# Patient Record
Sex: FEMALE | Race: BLACK OR AFRICAN AMERICAN | Employment: FULL TIME
[De-identification: names, ages, dates, MRNs, and addresses within clinical notes are randomized per-mention and may not be internally consistent; named-entity substitution may affect disease eponyms.]

---

## 2023-12-14 ENCOUNTER — OFFICE VISIT (OUTPATIENT)
Dept: FAMILY MEDICINE CLINIC | Facility: CLINIC | Age: 43
End: 2023-12-14
Payer: COMMERCIAL

## 2023-12-14 VITALS
OXYGEN SATURATION: 97 % | HEIGHT: 62 IN | BODY MASS INDEX: 24.29 KG/M2 | DIASTOLIC BLOOD PRESSURE: 88 MMHG | TEMPERATURE: 98.6 F | WEIGHT: 132 LBS | HEART RATE: 90 BPM | SYSTOLIC BLOOD PRESSURE: 134 MMHG

## 2023-12-14 DIAGNOSIS — Z00.00 ROUTINE GENERAL MEDICAL EXAMINATION AT A HEALTH CARE FACILITY: Primary | ICD-10-CM

## 2023-12-14 DIAGNOSIS — Z13.220 SCREENING FOR LIPID DISORDERS: ICD-10-CM

## 2023-12-14 DIAGNOSIS — Z11.4 SCREENING FOR HIV (HUMAN IMMUNODEFICIENCY VIRUS): ICD-10-CM

## 2023-12-14 DIAGNOSIS — E55.9 VITAMIN D DEFICIENCY: ICD-10-CM

## 2023-12-14 DIAGNOSIS — R20.2 TINGLING OF LEFT UPPER EXTREMITY: ICD-10-CM

## 2023-12-14 DIAGNOSIS — Z23 NEED FOR TETANUS BOOSTER: ICD-10-CM

## 2023-12-14 DIAGNOSIS — R53.82 CHRONIC FATIGUE: ICD-10-CM

## 2023-12-14 DIAGNOSIS — R41.840 ATTENTION AND CONCENTRATION DEFICIT: ICD-10-CM

## 2023-12-14 DIAGNOSIS — Z11.59 NEED FOR HEPATITIS C SCREENING TEST: ICD-10-CM

## 2023-12-14 LAB
BASOPHILS # BLD: 0.1 K/UL (ref 0–0.2)
BASOPHILS NFR BLD: 1 % (ref 0–2)
DIFFERENTIAL METHOD BLD: NORMAL
EOSINOPHIL # BLD: 0.2 K/UL (ref 0–0.8)
EOSINOPHIL NFR BLD: 2 % (ref 0.5–7.8)
ERYTHROCYTE [DISTWIDTH] IN BLOOD BY AUTOMATED COUNT: 14 % (ref 11.9–14.6)
HCT VFR BLD AUTO: 39.5 % (ref 35.8–46.3)
HCV AB SER QL: NONREACTIVE
HGB BLD-MCNC: 13 G/DL (ref 11.7–15.4)
HIV 1+2 AB+HIV1 P24 AG SERPL QL IA: NONREACTIVE
HIV 1/2 RESULT COMMENT: NORMAL
IMM GRANULOCYTES # BLD AUTO: 0 K/UL (ref 0–0.5)
IMM GRANULOCYTES NFR BLD AUTO: 0 % (ref 0–5)
LYMPHOCYTES # BLD: 2.8 K/UL (ref 0.5–4.6)
LYMPHOCYTES NFR BLD: 34 % (ref 13–44)
MCH RBC QN AUTO: 31.6 PG (ref 26.1–32.9)
MCHC RBC AUTO-ENTMCNC: 32.9 G/DL (ref 31.4–35)
MCV RBC AUTO: 95.9 FL (ref 82–102)
MONOCYTES # BLD: 0.8 K/UL (ref 0.1–1.3)
MONOCYTES NFR BLD: 9 % (ref 4–12)
NEUTS SEG # BLD: 4.5 K/UL (ref 1.7–8.2)
NEUTS SEG NFR BLD: 54 % (ref 43–78)
NRBC # BLD: 0 K/UL (ref 0–0.2)
PLATELET # BLD AUTO: 400 K/UL (ref 150–450)
PMV BLD AUTO: 9.8 FL (ref 9.4–12.3)
RBC # BLD AUTO: 4.12 M/UL (ref 4.05–5.2)
WBC # BLD AUTO: 8.3 K/UL (ref 4.3–11.1)

## 2023-12-14 PROCEDURE — 4004F PT TOBACCO SCREEN RCVD TLK: CPT

## 2023-12-14 PROCEDURE — 90471 IMMUNIZATION ADMIN: CPT

## 2023-12-14 PROCEDURE — G8420 CALC BMI NORM PARAMETERS: HCPCS

## 2023-12-14 PROCEDURE — 90715 TDAP VACCINE 7 YRS/> IM: CPT

## 2023-12-14 PROCEDURE — 99204 OFFICE O/P NEW MOD 45 MIN: CPT

## 2023-12-14 PROCEDURE — G8427 DOCREV CUR MEDS BY ELIG CLIN: HCPCS

## 2023-12-14 PROCEDURE — G8484 FLU IMMUNIZE NO ADMIN: HCPCS

## 2023-12-14 RX ORDER — METHYLPREDNISOLONE 4 MG/1
TABLET ORAL
Qty: 1 KIT | Refills: 0 | Status: SHIPPED | OUTPATIENT
Start: 2023-12-14 | End: 2023-12-20

## 2023-12-14 RX ORDER — IBUPROFEN 800 MG/1
800 TABLET ORAL 2 TIMES DAILY PRN
Qty: 28 TABLET | Refills: 0 | Status: SHIPPED | OUTPATIENT
Start: 2023-12-14 | End: 2023-12-28

## 2023-12-14 RX ORDER — CALCIUM CARBONATE 500 MG/1
1 TABLET, CHEWABLE ORAL DAILY
COMMUNITY

## 2023-12-14 SDOH — ECONOMIC STABILITY: HOUSING INSECURITY
IN THE LAST 12 MONTHS, WAS THERE A TIME WHEN YOU DID NOT HAVE A STEADY PLACE TO SLEEP OR SLEPT IN A SHELTER (INCLUDING NOW)?: PATIENT REFUSED

## 2023-12-14 SDOH — ECONOMIC STABILITY: FOOD INSECURITY: WITHIN THE PAST 12 MONTHS, THE FOOD YOU BOUGHT JUST DIDN'T LAST AND YOU DIDN'T HAVE MONEY TO GET MORE.: PATIENT DECLINED

## 2023-12-14 SDOH — ECONOMIC STABILITY: INCOME INSECURITY: HOW HARD IS IT FOR YOU TO PAY FOR THE VERY BASICS LIKE FOOD, HOUSING, MEDICAL CARE, AND HEATING?: PATIENT DECLINED

## 2023-12-14 SDOH — HEALTH STABILITY: PHYSICAL HEALTH: ON AVERAGE, HOW MANY DAYS PER WEEK DO YOU ENGAGE IN MODERATE TO STRENUOUS EXERCISE (LIKE A BRISK WALK)?: 0 DAYS

## 2023-12-14 SDOH — ECONOMIC STABILITY: FOOD INSECURITY: WITHIN THE PAST 12 MONTHS, YOU WORRIED THAT YOUR FOOD WOULD RUN OUT BEFORE YOU GOT MONEY TO BUY MORE.: PATIENT DECLINED

## 2023-12-14 ASSESSMENT — PATIENT HEALTH QUESTIONNAIRE - PHQ9
6. FEELING BAD ABOUT YOURSELF - OR THAT YOU ARE A FAILURE OR HAVE LET YOURSELF OR YOUR FAMILY DOWN: 0
SUM OF ALL RESPONSES TO PHQ QUESTIONS 1-9: 15
SUM OF ALL RESPONSES TO PHQ QUESTIONS 1-9: 15
SUM OF ALL RESPONSES TO PHQ9 QUESTIONS 1 & 2: 6
7. TROUBLE CONCENTRATING ON THINGS, SUCH AS READING THE NEWSPAPER OR WATCHING TELEVISION: 3
SUM OF ALL RESPONSES TO PHQ QUESTIONS 1-9: 15
1. LITTLE INTEREST OR PLEASURE IN DOING THINGS: 3
8. MOVING OR SPEAKING SO SLOWLY THAT OTHER PEOPLE COULD HAVE NOTICED. OR THE OPPOSITE, BEING SO FIGETY OR RESTLESS THAT YOU HAVE BEEN MOVING AROUND A LOT MORE THAN USUAL: 0
4. FEELING TIRED OR HAVING LITTLE ENERGY: 3
5. POOR APPETITE OR OVEREATING: 0
10. IF YOU CHECKED OFF ANY PROBLEMS, HOW DIFFICULT HAVE THESE PROBLEMS MADE IT FOR YOU TO DO YOUR WORK, TAKE CARE OF THINGS AT HOME, OR GET ALONG WITH OTHER PEOPLE: 1
2. FEELING DOWN, DEPRESSED OR HOPELESS: 3
SUM OF ALL RESPONSES TO PHQ QUESTIONS 1-9: 15
3. TROUBLE FALLING OR STAYING ASLEEP: 3
9. THOUGHTS THAT YOU WOULD BE BETTER OFF DEAD, OR OF HURTING YOURSELF: 0

## 2023-12-14 NOTE — PROGRESS NOTES
nerve inflammation in left arm- will start with steroid and NSAID. Encouraged to message in 2-3 weeks if no improvement as we may need to obtain imaging or refer to Ortho. Assessment & Plan  Pt was encouraged to sign up/go to [facility] for access to information at a later time. Follow Up    Return in about 6 months (around 6/14/2024) for follow up; pap smear .       Orders Placed This Encounter   Procedures    [facility] (age 8 yrs+), IM    CBC with Auto Differential     Standing Status:   Future     Number of Occurrences:   1     Standing Expiration Date:   12/14/2024    Comprehensive Metabolic Panel     Standing Status:   Future     Number of Occurrences:   1     Standing Expiration Date:   1/14/2024    Lipid Panel     Standing Status:   Future     Number of Occurrences:   1     Standing Expiration Date:   1/14/2024     Order Specific Question:   Is Patient Fasting?/# of Hours     Answer:   no    Vitamin D 25 Hydroxy     Standing Status:   Future     Number of Occurrences:   1     Standing Expiration Date:   1/14/2024    TSH with Reflex     Standing Status:   Future     Number of Occurrences:   1     Standing Expiration Date:   1/14/2024    Hepatitis C Antibody     Standing Status:   Future     Number of Occurrences:   1     Standing Expiration Date:   6/14/2024    HIV 1/2 Ag/Ab, 4TH Generation,W Rflx Confirm     Standing Status:   Future     Number of Occurrences:   1     Standing Expiration Date:   3/14/2024    [facility] ([facility])     Referral Priority:   Routine     Referral Type:   Eval and Treat     Referral Reason:   Specialty Services Required     Referral Location:   [facility] ([facility])     Requested Specialty:   Psychiatry     Number of Visits Requested:   [id number] [facility],First Floor, APRN - CNP   [id number] [facility],[address] [address]  Dept: [phone]  Dept Fax:

## 2023-12-14 NOTE — PATIENT INSTRUCTIONS
Please let me know if you don't improve with your reflux and left arm tingling within 2-3 weeks. I can prescribe a medication for the reflux that may help. If the tingling does not improve, we may have to consider an Ortho referral or possible imaging. Do not take additional NSAIDS like Aleve with the Ibuprofen. You can take Tylenol with the Ibuprofen.

## 2023-12-15 DIAGNOSIS — E55.9 VITAMIN D DEFICIENCY: Primary | ICD-10-CM

## 2023-12-15 LAB
25(OH)D3 SERPL-MCNC: 8.3 NG/ML (ref 30–100)
ALBUMIN SERPL-MCNC: 4 G/DL (ref 3.5–5)
ALBUMIN/GLOB SERPL: 1.1 (ref 0.4–1.6)
ALP SERPL-CCNC: 65 U/L (ref 50–136)
ALT SERPL-CCNC: 21 U/L (ref 12–65)
ANION GAP SERPL CALC-SCNC: 5 MMOL/L (ref 2–11)
AST SERPL-CCNC: 14 U/L (ref 15–37)
BILIRUB SERPL-MCNC: 0.5 MG/DL (ref 0.2–1.1)
BUN SERPL-MCNC: 13 MG/DL (ref 6–23)
CALCIUM SERPL-MCNC: 9.1 MG/DL (ref 8.3–10.4)
CHLORIDE SERPL-SCNC: 105 MMOL/L (ref 103–113)
CHOLEST SERPL-MCNC: 276 MG/DL
CO2 SERPL-SCNC: 26 MMOL/L (ref 21–32)
CREAT SERPL-MCNC: 0.7 MG/DL (ref 0.6–1)
GLOBULIN SER CALC-MCNC: 3.6 G/DL (ref 2.8–4.5)
GLUCOSE SERPL-MCNC: 103 MG/DL (ref 65–100)
HDLC SERPL-MCNC: 65 MG/DL (ref 40–60)
HDLC SERPL: 4.2
LDLC SERPL CALC-MCNC: 158.6 MG/DL
POTASSIUM SERPL-SCNC: 4.4 MMOL/L (ref 3.5–5.1)
PROT SERPL-MCNC: 7.6 G/DL (ref 6.3–8.2)
SODIUM SERPL-SCNC: 136 MMOL/L (ref 136–146)
TRIGL SERPL-MCNC: 262 MG/DL (ref 35–150)
TSH W FREE THYROID IF ABNORMAL: 0.36 UIU/ML (ref 0.36–3.74)
VLDLC SERPL CALC-MCNC: 52.4 MG/DL (ref 6–23)

## 2023-12-15 RX ORDER — ERGOCALCIFEROL 1.25 MG/1
50000 CAPSULE ORAL WEEKLY
Qty: 12 CAPSULE | Refills: 0 | Status: SHIPPED | OUTPATIENT
Start: 2023-12-15